# Patient Record
Sex: FEMALE | Race: WHITE | NOT HISPANIC OR LATINO | URBAN - METROPOLITAN AREA
[De-identification: names, ages, dates, MRNs, and addresses within clinical notes are randomized per-mention and may not be internally consistent; named-entity substitution may affect disease eponyms.]

---

## 2024-10-22 PROBLEM — Z00.00 ENCOUNTER FOR PREVENTIVE HEALTH EXAMINATION: Status: ACTIVE | Noted: 2024-10-22

## 2024-11-06 ENCOUNTER — OUTPATIENT (OUTPATIENT)
Dept: OUTPATIENT SERVICES | Facility: HOSPITAL | Age: 44
LOS: 1 days | End: 2024-11-06
Payer: COMMERCIAL

## 2024-11-06 ENCOUNTER — TRANSCRIPTION ENCOUNTER (OUTPATIENT)
Age: 44
End: 2024-11-06

## 2024-11-06 VITALS
HEART RATE: 82 BPM | SYSTOLIC BLOOD PRESSURE: 118 MMHG | OXYGEN SATURATION: 100 % | TEMPERATURE: 99 F | DIASTOLIC BLOOD PRESSURE: 69 MMHG | HEIGHT: 69 IN | WEIGHT: 171.08 LBS | RESPIRATION RATE: 15 BRPM

## 2024-11-06 DIAGNOSIS — Z01.818 ENCOUNTER FOR OTHER PREPROCEDURAL EXAMINATION: ICD-10-CM

## 2024-11-06 DIAGNOSIS — K42.0 UMBILICAL HERNIA WITH OBSTRUCTION, WITHOUT GANGRENE: ICD-10-CM

## 2024-11-06 DIAGNOSIS — K08.409 PARTIAL LOSS OF TEETH, UNSPECIFIED CAUSE, UNSPECIFIED CLASS: Chronic | ICD-10-CM

## 2024-11-06 LAB
HCG SERPL-ACNC: <1 MIU/ML — SIGNIFICANT CHANGE UP
HCT VFR BLD CALC: 38.1 % — SIGNIFICANT CHANGE UP (ref 34.5–45)
HGB BLD-MCNC: 12.9 G/DL — SIGNIFICANT CHANGE UP (ref 11.5–15.5)
MCHC RBC-ENTMCNC: 31.6 PG — SIGNIFICANT CHANGE UP (ref 27–34)
MCHC RBC-ENTMCNC: 33.9 G/DL — SIGNIFICANT CHANGE UP (ref 32–36)
MCV RBC AUTO: 93.4 FL — SIGNIFICANT CHANGE UP (ref 80–100)
NRBC # BLD: 0 /100 WBCS — SIGNIFICANT CHANGE UP (ref 0–0)
PLATELET # BLD AUTO: 224 K/UL — SIGNIFICANT CHANGE UP (ref 150–400)
RBC # BLD: 4.08 M/UL — SIGNIFICANT CHANGE UP (ref 3.8–5.2)
RBC # FLD: 12.5 % — SIGNIFICANT CHANGE UP (ref 10.3–14.5)
WBC # BLD: 4.44 K/UL — SIGNIFICANT CHANGE UP (ref 3.8–10.5)
WBC # FLD AUTO: 4.44 K/UL — SIGNIFICANT CHANGE UP (ref 3.8–10.5)

## 2024-11-06 PROCEDURE — 86850 RBC ANTIBODY SCREEN: CPT

## 2024-11-06 PROCEDURE — G0463: CPT

## 2024-11-06 PROCEDURE — 36415 COLL VENOUS BLD VENIPUNCTURE: CPT

## 2024-11-06 PROCEDURE — 86900 BLOOD TYPING SEROLOGIC ABO: CPT

## 2024-11-06 PROCEDURE — 84702 CHORIONIC GONADOTROPIN TEST: CPT

## 2024-11-06 PROCEDURE — 86901 BLOOD TYPING SEROLOGIC RH(D): CPT

## 2024-11-06 PROCEDURE — 85027 COMPLETE CBC AUTOMATED: CPT

## 2024-11-06 NOTE — H&P PST ADULT - NSICDXFAMILYHX_GEN_ALL_CORE_FT
FAMILY HISTORY:  Mother  Still living? Unknown  FH: Parkinson's disease, Age at diagnosis: Age Unknown    Aunt  Still living? Unknown  FH: breast cancer, Age at diagnosis: Age Unknown    Uncle  Still living? Unknown  FH: colon cancer, Age at diagnosis: Age Unknown

## 2024-11-06 NOTE — H&P PST ADULT - PROBLEM SELECTOR PLAN 2
Labs CBC, HCG and T&S   No Medical clearance necessary  Pre op and Hibiclens instructions reviewed and given.   No meds to take am of surgery.   Instructed to hold and/or avoid other NSAIDs and OTC supplements. Tylenol is ok. Verbalized understanding

## 2024-11-06 NOTE — H&P PST ADULT - HISTORY OF PRESENT ILLNESS
43 y/o female with PMH of mild GERD and Psorasis & SHx [place] Teeth Extraction for PST having Robot Assist Laparoscopic Umbilical Hernia Repair by Dr. Sun on 11/14/2024.  She reports umbilical hernia for the past 13 years.  States over the past several months the reducible bulge has significantly enlarged and is causing intermittent pain.  Was seen by provider and recommended for the elective procedure.

## 2024-11-06 NOTE — H&P PST ADULT - NSICDXPROCEDURE_GEN_ALL_CORE_FT
PROCEDURES:  Repair, hernia, nonreducible, abdominal wall, anterior, without history of prior repair, less than 3 cm, with mesh insertion 06-Nov-2024 08:21:13  [facility] Chel Morrison

## 2024-11-14 ENCOUNTER — TRANSCRIPTION ENCOUNTER (OUTPATIENT)
Age: 44
End: 2024-11-14

## 2024-11-14 ENCOUNTER — OUTPATIENT (OUTPATIENT)
Dept: OUTPATIENT SERVICES | Facility: HOSPITAL | Age: 44
LOS: 1 days | End: 2024-11-14
Payer: COMMERCIAL

## 2024-11-14 VITALS
SYSTOLIC BLOOD PRESSURE: 109 MMHG | HEART RATE: 75 BPM | WEIGHT: 171.08 LBS | HEIGHT: 69 IN | DIASTOLIC BLOOD PRESSURE: 56 MMHG | OXYGEN SATURATION: 97 % | RESPIRATION RATE: 14 BRPM | TEMPERATURE: 98 F

## 2024-11-14 VITALS
DIASTOLIC BLOOD PRESSURE: 71 MMHG | SYSTOLIC BLOOD PRESSURE: 105 MMHG | OXYGEN SATURATION: 97 % | HEART RATE: 72 BPM | RESPIRATION RATE: 12 BRPM

## 2024-11-14 DIAGNOSIS — K42.0 UMBILICAL HERNIA WITH OBSTRUCTION, WITHOUT GANGRENE: ICD-10-CM

## 2024-11-14 DIAGNOSIS — Z98.890 OTHER SPECIFIED POSTPROCEDURAL STATES: Chronic | ICD-10-CM

## 2024-11-14 DIAGNOSIS — K08.409 PARTIAL LOSS OF TEETH, UNSPECIFIED CAUSE, UNSPECIFIED CLASS: Chronic | ICD-10-CM

## 2024-11-14 LAB — HCG UR QL: NEGATIVE — SIGNIFICANT CHANGE UP

## 2024-11-14 PROCEDURE — C1781: CPT

## 2024-11-14 PROCEDURE — S2900: CPT

## 2024-11-14 PROCEDURE — 49594 RPR AA HRN 1ST 3-10 NCR/STRN: CPT

## 2024-11-14 PROCEDURE — 81025 URINE PREGNANCY TEST: CPT

## 2024-11-14 PROCEDURE — C9399: CPT

## 2024-11-14 DEVICE — MESH HERNIA VENTRAL SYMBOTEX COMPOSITE ROUND 12CM: Type: IMPLANTABLE DEVICE | Status: FUNCTIONAL

## 2024-11-14 DEVICE — MESH HERNIA INGUINAL PARIETEX PROGRIP RECTANGLE 15 X 9CM: Type: IMPLANTABLE DEVICE | Status: FUNCTIONAL

## 2024-11-14 RX ORDER — OXYCODONE HYDROCHLORIDE 30 MG/1
1 TABLET ORAL
Qty: 8 | Refills: 0
Start: 2024-11-14

## 2024-11-14 RX ORDER — ACETAMINOPHEN 500 MG
2 TABLET ORAL
Qty: 30 | Refills: 0
Start: 2024-11-14

## 2024-11-14 RX ORDER — OXYCODONE HYDROCHLORIDE 30 MG/1
5 TABLET ORAL ONCE
Refills: 0 | Status: DISCONTINUED | OUTPATIENT
Start: 2024-11-14 | End: 2024-11-14

## 2024-11-14 RX ORDER — HYDROMORPHONE HCL/0.9% NACL/PF 6 MG/30 ML
0.5 PATIENT CONTROLLED ANALGESIA SYRINGE INTRAVENOUS
Refills: 0 | Status: DISCONTINUED | OUTPATIENT
Start: 2024-11-14 | End: 2024-11-14

## 2024-11-14 RX ORDER — ONDANSETRON HYDROCHLORIDE 2 MG/ML
4 INJECTION, SOLUTION INTRAMUSCULAR; INTRAVENOUS ONCE
Refills: 0 | Status: COMPLETED | OUTPATIENT
Start: 2024-11-14 | End: 2024-11-14

## 2024-11-14 RX ORDER — CLINDAMYCIN PHOSPHATE 150 MG/ML
900 VIAL (ML) INJECTION ONCE
Refills: 0 | Status: COMPLETED | OUTPATIENT
Start: 2024-11-14 | End: 2024-11-14

## 2024-11-14 RX ORDER — POLYETHYLENE GLYCOL 3350 17 G/17G
17 POWDER, FOR SOLUTION ORAL
Qty: 1 | Refills: 0
Start: 2024-11-14

## 2024-11-14 RX ORDER — IBUPROFEN 200 MG
1 TABLET ORAL
Qty: 30 | Refills: 0
Start: 2024-11-14

## 2024-11-14 RX ADMIN — Medication 75 MILLILITER(S): at 14:34

## 2024-11-14 RX ADMIN — Medication 0.5 MILLIGRAM(S): at 14:31

## 2024-11-14 RX ADMIN — ONDANSETRON HYDROCHLORIDE 4 MILLIGRAM(S): 2 INJECTION, SOLUTION INTRAMUSCULAR; INTRAVENOUS at 14:31

## 2024-11-14 RX ADMIN — Medication 0.5 MILLIGRAM(S): at 14:48

## 2024-11-14 RX ADMIN — Medication 0.5 MILLIGRAM(S): at 14:46

## 2024-11-14 RX ADMIN — Medication 0.5 MILLIGRAM(S): at 15:03

## 2024-11-14 NOTE — BRIEF OPERATIVE NOTE - NSICDXBRIEFPOSTOP_GEN_ALL_CORE_FT
POST-OP DIAGNOSIS:  Obstructed umbilical hernia 14-Nov-2024 14:21:12  Jaret Sun  Incarcerated ventral hernia 14-Nov-2024 14:21:07  Jaret Sun

## 2024-11-14 NOTE — BRIEF OPERATIVE NOTE - NSICDXBRIEFPREOP_GEN_ALL_CORE_FT
PRE-OP DIAGNOSIS:  Incarcerated ventral hernia 14-Nov-2024 14:20:47  Jaret Sun  Obstructed umbilical hernia 14-Nov-2024 14:21:03  Jaret Sun

## 2024-11-14 NOTE — ASU DISCHARGE PLAN (ADULT/PEDIATRIC) - FINANCIAL ASSISTANCE
[facility] provides services at a reduced cost to those who are determined to be eligible through [facility]’s financial assistance program. Information regarding [facility]’s financial assistance program can be found by going to [email or web address] or by calling [phone].

## 2024-11-14 NOTE — ASU DISCHARGE PLAN (ADULT/PEDIATRIC) - CARE PROVIDER_API CALL
Jaret Sun [facility]  [address], [address]  [place] [address]  Phone: [phone]  Fax: [phone]  Follow Up Time:

## 2024-11-14 NOTE — BRIEF OPERATIVE NOTE - NSICDXBRIEFPROCEDURE_GEN_ALL_CORE_FT
PROCEDURES:  Repair, hernia, ventral, laparoscopic, using component separation technique 14-Nov-2024 14:20:23  Jaret Sun

## 2024-11-14 NOTE — ASU DISCHARGE PLAN (ADULT/PEDIATRIC) - ASU DC SPECIAL INSTRUCTIONSFT
You may take a shower in 24 hours.  Do not let water hit wounds directly, do not rub incisions.      Do not drive for 24 hours after surgery.  Do not drive if taking narcotic pain medications.  Do not drive until pain-free.      No heavy lifting (nothing heavier than 5 lbs.), no strenuous physical activity, no exercise.      You are encouraged to walk as much as possible to prevent blood clots in the legs, to maintain lung health after surgery/anesthesia, and to prevent constipation after surgery.      Continue to use the incentive spirometer at home.    You may perform your usual daily tasks as tolerated.      You may resume your usual daily diet as tolerated.    For pain after surgery, take the followin. 600mg prescription ibuprofen - take 1 pill every 6 hours with food regularly for the first 3 days after surgery, then as needed  2. 500mg Extra-Strength Tylenol - take 2 pills every 6 hours regularly for the first 3 days after surgery, then as needed.  DO NOT EXCEED 4,000MG OF ACETAMINOPHEN PER DAY.  3. If you still have pain despite the ibuprofen/tylenol combination, then take the oxycodone as prescribed.  Take colace as prescribed while taking narcotic pain medication to prevent constipation.      Follow-up with Dr. Sun in his office next week - call office for appointment if not already made.
